# Patient Record
Sex: MALE | Race: WHITE
[De-identification: names, ages, dates, MRNs, and addresses within clinical notes are randomized per-mention and may not be internally consistent; named-entity substitution may affect disease eponyms.]

---

## 2020-12-14 ENCOUNTER — HOSPITAL ENCOUNTER (OUTPATIENT)
Dept: HOSPITAL 75 - RAD | Age: 50
End: 2020-12-14
Attending: NURSE PRACTITIONER
Payer: SELF-PAY
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
## 2020-12-14 DIAGNOSIS — M54.2: Primary | ICD-10-CM

## 2020-12-14 PROCEDURE — 72040 X-RAY EXAM NECK SPINE 2-3 VW: CPT
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
